# Patient Record
Sex: FEMALE | Race: WHITE | NOT HISPANIC OR LATINO | URBAN - NONMETROPOLITAN AREA
[De-identification: names, ages, dates, MRNs, and addresses within clinical notes are randomized per-mention and may not be internally consistent; named-entity substitution may affect disease eponyms.]

---

## 2021-04-29 ENCOUNTER — IMPORTED ENCOUNTER (OUTPATIENT)
Dept: URBAN - NONMETROPOLITAN AREA CLINIC 1 | Facility: CLINIC | Age: 66
End: 2021-04-29

## 2021-04-29 PROBLEM — H52.223: Noted: 2021-04-29

## 2021-04-29 PROBLEM — H25.813: Noted: 2021-04-29

## 2021-04-29 PROBLEM — H52.4: Noted: 2021-04-29

## 2021-04-29 PROBLEM — H52.03: Noted: 2021-04-29

## 2021-04-29 PROCEDURE — 92015 DETERMINE REFRACTIVE STATE: CPT

## 2021-04-29 PROCEDURE — 92004 COMPRE OPH EXAM NEW PT 1/>: CPT

## 2021-04-29 NOTE — PATIENT DISCUSSION
Hyperopia-Discussed diagnosis with patient. Updated spec Rx given. Recommend lens that will provide comfort as well as protect safety and health of eyes. Cataract OU-Reviewed symptoms of advancing cataract growth such as glare and halos and decreased vision.-Continue to monitor for now. Pt will notify us if any new symptoms develop.

## 2022-04-09 ASSESSMENT — TONOMETRY
OD_IOP_MMHG: 18
OS_IOP_MMHG: 18

## 2022-04-09 ASSESSMENT — VISUAL ACUITY
OU_CC: 20/20
OS_SC: 20/25+
OD_SC: 20/30